# Patient Record
Sex: MALE | Race: WHITE | Employment: FULL TIME | URBAN - METROPOLITAN AREA
[De-identification: names, ages, dates, MRNs, and addresses within clinical notes are randomized per-mention and may not be internally consistent; named-entity substitution may affect disease eponyms.]

---

## 2018-07-14 ENCOUNTER — HOSPITAL ENCOUNTER (EMERGENCY)
Age: 28
Discharge: HOME | End: 2018-07-14
Attending: EMERGENCY MEDICINE
Payer: COMMERCIAL

## 2018-07-14 VITALS
BODY MASS INDEX: 34.24 KG/M2 | TEMPERATURE: 97.9 F | WEIGHT: 258.38 LBS | SYSTOLIC BLOOD PRESSURE: 147 MMHG | HEIGHT: 73 IN | DIASTOLIC BLOOD PRESSURE: 96 MMHG | HEART RATE: 70 BPM | RESPIRATION RATE: 18 BRPM | OXYGEN SATURATION: 97 %

## 2018-07-14 DIAGNOSIS — K13.79 MOUTH PAIN: ICD-10-CM

## 2018-07-14 DIAGNOSIS — R22.0 SWELLING OF UPPER LIP: Primary | ICD-10-CM

## 2018-07-14 LAB
BASOPHILS # BLD: 0 K/UL (ref 0–0.1)
BASOPHILS NFR BLD: 0 % (ref 0–1)
DIFFERENTIAL METHOD BLD: ABNORMAL
EOSINOPHIL # BLD: 0.1 K/UL (ref 0–0.4)
EOSINOPHIL NFR BLD: 1 % (ref 0–7)
ERYTHROCYTE [DISTWIDTH] IN BLOOD BY AUTOMATED COUNT: 12 % (ref 11.5–14.5)
HCT VFR BLD AUTO: 48.1 % (ref 36.6–50.3)
HGB BLD-MCNC: 16.5 G/DL (ref 12.1–17)
IMM GRANULOCYTES # BLD: 0 K/UL (ref 0–0.04)
IMM GRANULOCYTES NFR BLD AUTO: 0 % (ref 0–0.5)
LYMPHOCYTES # BLD: 2.6 K/UL (ref 0.8–3.5)
LYMPHOCYTES NFR BLD: 21 % (ref 12–49)
MCH RBC QN AUTO: 30.1 PG (ref 26–34)
MCHC RBC AUTO-ENTMCNC: 34.3 G/DL (ref 30–36.5)
MCV RBC AUTO: 87.8 FL (ref 80–99)
MONOCYTES # BLD: 1.2 K/UL (ref 0–1)
MONOCYTES NFR BLD: 10 % (ref 5–13)
NEUTS SEG # BLD: 8.7 K/UL (ref 1.8–8)
NEUTS SEG NFR BLD: 69 % (ref 32–75)
NRBC # BLD: 0 K/UL (ref 0–0.01)
NRBC BLD-RTO: 0 PER 100 WBC
PLATELET # BLD AUTO: 240 K/UL (ref 150–400)
PMV BLD AUTO: 11 FL (ref 8.9–12.9)
RBC # BLD AUTO: 5.48 M/UL (ref 4.1–5.7)
WBC # BLD AUTO: 12.7 K/UL (ref 4.1–11.1)

## 2018-07-14 PROCEDURE — 99283 EMERGENCY DEPT VISIT LOW MDM: CPT

## 2018-07-14 PROCEDURE — 74011250637 HC RX REV CODE- 250/637: Performed by: NURSE PRACTITIONER

## 2018-07-14 PROCEDURE — 36415 COLL VENOUS BLD VENIPUNCTURE: CPT | Performed by: NURSE PRACTITIONER

## 2018-07-14 PROCEDURE — 96374 THER/PROPH/DIAG INJ IV PUSH: CPT

## 2018-07-14 PROCEDURE — 85025 COMPLETE CBC W/AUTO DIFF WBC: CPT | Performed by: NURSE PRACTITIONER

## 2018-07-14 PROCEDURE — 74011250636 HC RX REV CODE- 250/636: Performed by: NURSE PRACTITIONER

## 2018-07-14 RX ORDER — CEPHALEXIN 500 MG/1
500 CAPSULE ORAL 4 TIMES DAILY
COMMUNITY
Start: 2018-07-10 | End: 2018-07-20

## 2018-07-14 RX ORDER — PHENOL/SODIUM PHENOLATE
20 AEROSOL, SPRAY (ML) MUCOUS MEMBRANE DAILY
COMMUNITY

## 2018-07-14 RX ORDER — KETOROLAC TROMETHAMINE 30 MG/ML
15 INJECTION, SOLUTION INTRAMUSCULAR; INTRAVENOUS
Status: COMPLETED | OUTPATIENT
Start: 2018-07-14 | End: 2018-07-14

## 2018-07-14 RX ORDER — PREDNISONE 20 MG/1
40 TABLET ORAL DAILY
COMMUNITY
Start: 2018-07-13 | End: 2018-07-14

## 2018-07-14 RX ORDER — SULFAMETHOXAZOLE AND TRIMETHOPRIM 800; 160 MG/1; MG/1
1 TABLET ORAL 2 TIMES DAILY
COMMUNITY
Start: 2018-07-13 | End: 2018-07-23

## 2018-07-14 RX ORDER — CETIRIZINE HCL 10 MG
10 TABLET ORAL DAILY
COMMUNITY

## 2018-07-14 RX ORDER — HYDROCODONE BITARTRATE AND ACETAMINOPHEN 5; 325 MG/1; MG/1
1 TABLET ORAL
Qty: 12 TAB | Refills: 0 | Status: SHIPPED | OUTPATIENT
Start: 2018-07-14

## 2018-07-14 RX ORDER — VALACYCLOVIR HYDROCHLORIDE 1 G/1
1000 TABLET, FILM COATED ORAL 3 TIMES DAILY
Qty: 21 TAB | Refills: 0 | Status: SHIPPED | OUTPATIENT
Start: 2018-07-14 | End: 2018-07-21

## 2018-07-14 RX ORDER — VALACYCLOVIR HYDROCHLORIDE 1 G/1
1000 TABLET, FILM COATED ORAL 2 TIMES DAILY
COMMUNITY
Start: 2018-07-17 | End: 2018-07-14

## 2018-07-14 RX ORDER — HYDROCODONE BITARTRATE AND ACETAMINOPHEN 5; 325 MG/1; MG/1
1 TABLET ORAL
Status: COMPLETED | OUTPATIENT
Start: 2018-07-14 | End: 2018-07-14

## 2018-07-14 RX ADMIN — KETOROLAC TROMETHAMINE 15 MG: 30 INJECTION, SOLUTION INTRAMUSCULAR; INTRAVENOUS at 10:40

## 2018-07-14 RX ADMIN — HYDROCODONE BITARTRATE AND ACETAMINOPHEN 1 TABLET: 5; 325 TABLET ORAL at 11:12

## 2018-07-14 NOTE — LETTER
[address] 
[address] [facility] [id number] 
[phone] Work/School Note Date: 7/14/2018 To Whom It May concern: 
 
Tasha Hernández was seen and treated today in the emergency room by the following provider(s): 
Attending Provider: Esperanza Watts MD 
Nurse Practitioner: Norman Lin NP. Tasha Hernández may return to work on 7/17/18. Sincerely, Norman Lin NP

## 2018-07-14 NOTE — ED PROVIDER NOTES
HPI Comments: 34 yo M presents ambulatory to the ED for evaluation of pain and swelling to the left upper lip. The pt states sx began with a \"pimple\" on Tuesday which he states popped and then began swelling on Wednesday. Pt states he was evaluated at the [address] [facility] on Wednesday and was prescribed Cephalexin QID and Valtrex at that time. States he was advised to discontinue Valtrex if no improvement of sx which he states he did after 2 doses. The pt states swelling and pain increased and he was evaluated at an Urgent Care yesterday and was also prescribed Bactrim and Prednisone. Pt reports no improvement of discomfort and presents today secondary to increase in pain and swelling. Denies trauma or injury prior to initial sx. Denies hx of HSV/cold sores or oral/genital lesions. Past Medical History:  No date: [facility])    Social History    Marital status: SINGLE              Spouse name:                       Years of education:                 Number of children:               Occupational History    None on file    Social History Main Topics    Smoking status: Former Smoker                                                                Packs/day: 0.00      Years: 0.00        Smokeless status: Never Used                        Alcohol use: Yes                Comment: Socially    Drug use: Yes                Special: Marijuana    Sexual activity: Not on file          Other Topics            Concern    None on file    Social History Narrative    None on file          Patient is a 33 y.o. male presenting with facial pain. The history is provided by the patient. Facial Pain    The incident occurred more than 2 days ago. He came to the ER via walk-in. Pertinent negatives include no numbness. Past Medical History:   Diagnosis Date    [facility])        Past Surgical History:   Procedure Laterality Date    ABDOMEN SURGERY PROC UNLISTED      Pyloric Stenosis         History reviewed.  No pertinent family history. Social History     Social History    Marital status: SINGLE     Spouse name: N/A    Number of children: N/A    Years of education: N/A     Occupational History    Not on file. Social History Main Topics    Smoking status: Former Smoker    Smokeless tobacco: Never Used    Alcohol use Yes      Comment: Socially    Drug use: Yes     Special: Marijuana    Sexual activity: Not on file     Other Topics Concern    Not on file     Social History Narrative    No narrative on file         ALLERGIES: Review of patient's allergies indicates no known allergies. Review of Systems   Constitutional: Negative for chills and fever. HENT: Negative for facial swelling, sore throat and trouble swallowing. Left upper lip swelling   Respiratory: Negative for shortness of breath. Cardiovascular: Negative for chest pain. Gastrointestinal: Negative for abdominal pain and nausea. Genitourinary: Negative. Musculoskeletal: Negative for neck pain. Skin: Negative for rash. Neurological: Negative for dizziness, speech difficulty and numbness. Psychiatric/Behavioral: Negative for confusion. Vitals:    07/14/18 0939   BP: (!) 155/104   Pulse: 76   Resp: 20   Temp: 98.2 °F (36.8 °C)   SpO2: 98%   Weight: 117.2 kg (258 lb 6 oz)   Height: 6' 1\" (1.854 m)            Physical Exam   Constitutional: He is oriented to person, place, and time. He appears well-developed and well-nourished. No distress. HENT:   Head: Normocephalic and atraumatic. Mouth/Throat: Oropharynx is clear and moist and mucous membranes are normal. No oral lesions. No lacerations. Swelling to L upper lip with no obvious lesions, sore noted   Eyes: Conjunctivae and EOM are normal. Pupils are equal, round, and reactive to light. Neck: Normal range of motion. Neck supple. Cardiovascular: Normal rate, regular rhythm, normal heart sounds and intact distal pulses.     Pulmonary/Chest: Effort normal and breath sounds normal.   No evidence of SOB. No tachypnea, tachycardia or evidence of distress. Abdominal: Soft. Bowel sounds are normal. He exhibits no distension and no mass. There is no tenderness. There is no rebound and no guarding. Musculoskeletal: Normal range of motion. Lymphadenopathy:        Head (right side): No submandibular adenopathy present. Head (left side): No submandibular adenopathy present. He has no cervical adenopathy. No anterior or posterior cervical adenopathy noted. Neurological: He is alert and oriented to person, place, and time. He has normal strength. No cranial nerve deficit or sensory deficit. Coordination normal. GCS eye subscore is 4. GCS verbal subscore is 5. GCS motor subscore is 6. Skin: Skin is warm and dry. Psychiatric: He has a normal mood and affect. His behavior is normal. Judgment and thought content normal.   Nursing note and vitals reviewed. MDM  Number of Diagnoses or Management Options  Mouth pain:   Swelling of upper lip:   Diagnosis management comments: 34 yo M with 5 day hx of L upper lip pain and swelling. Pt states sx started as a pimple on Tuesday which he states popped and then noted swelling to L upper lip on Wednesday for which he states he was evaluated at the [facility] [address] [facility] and prescribed Cephalexin and Valtrex. Was advised to discontinue Valtrex if no improvement of sx which the pt states he did after two doses. The pt states swelling and discomfort continued and he was re-evaluated yesterday at an Urgent Care center where he was prescribed Bactrim DS and Prednisone to take in addition to Keflex. Pt denies improvement of sx and increase in pain which he states has been unrelieved by Ibuprofen. Pt denies F/C, known hx of HSV/oral or penile lesions. Denies trauma or injury. CBC, medication for discomfort ordered for pt.   Discussed hx, presentation and findings with Dr. Karyle Hailey who personally evaluated the pt and agrees with plan of care for pt. Pt notes improvement of discomfort in ED. Will add pain medication, Valtrex for DC. Discussed stopping Prednisone with Dr. Blane Bauman who agrees with discharge plan for pt. Pt to return to ED for worsening sx which were reviewed prior to discharge. Pt provided work excuse as well as narcotic precautions prior to discharge.      LABORATORY TESTS:  Recent Results (from the past 12 hour(s))  -CBC WITH AUTOMATED DIFF  Collection Time: 07/14/18 10:42 AM       Result                                            Value                         Ref Range                       WBC                                               12.7 (H)                      4.1 - 11.1 K/uL                 RBC                                               5.48                          4.10 - 5.70 M/uL                HGB                                               16.5                          12.1 - 17.0 g/dL                HCT                                               48.1                          36.6 - 50.3 %                   MCV                                               87.8                          80.0 - 99.0 FL                  MCH                                               30.1                          26.0 - 34.0 PG                  MCHC                                              34.3                          30.0 - 36.5 g/dL                RDW                                               12.0                          11.5 - 14.5 %                   PLATELET                                          240                           150 - 400 K/uL                  MPV                                               11.0                          8.9 - 12.9 FL                   NRBC                                              0.0                           0  WBC                   ABSOLUTE NRBC                                     0.00                          0.00 - 0.01 K/uL                NEUTROPHILS 69                            32 - 75 %                       LYMPHOCYTES                                       21                            12 - 49 %                       MONOCYTES                                         10                            5 - 13 %                        EOSINOPHILS                                       1                             0 - 7 %                         BASOPHILS                                         0                             0 - 1 %                         IMMATURE GRANULOCYTES                             0                             0.0 - 0.5 %                     ABS. NEUTROPHILS                                  8.7 (H)                       1.8 - 8.0 K/UL                  ABS. LYMPHOCYTES                                  2.6                           0.8 - 3.5 K/UL                  ABS. MONOCYTES                                    1.2 (H)                       0.0 - 1.0 K/UL                  ABS. EOSINOPHILS                                  0.1                           0.0 - 0.4 K/UL                  ABS. BASOPHILS                                    0.0                           0.0 - 0.1 K/UL                  ABS. IMM. GRANS.                                  0.0                           0.00 - 0.04 K/UL                DF                                                AUTOMATED                                                  IMAGING RESULTS:  No orders to display    MEDICATIONS GIVEN:  Medications  ketorolac (TORADOL) injection 15 mg (15 mg IntraVENous Given 7/14/18 1040)  HYDROcodone-acetaminophen (NORCO) 5-325 mg per tablet 1 Tab (1 Tab Oral Given 7/14/18 1112)    IMPRESSION:  Swelling of upper lip  (primary encounter diagnosis)  Mouth pain    PLAN:  1.  Discharge Medication List as of 7/14/2018 11:52 AM    START taking these medications    HYDROcodone-acetaminophen (NORCO) 5-325 mg per tablet  Take 1 Tab by mouth every four (4) hours as needed for Pain. Max Daily Amount: 6 Tabs. Indications: Pain, Print, Disp-12 Tab, R-0      CONTINUE these medications which have CHANGED    valACYclovir (VALTREX) 1 gram tablet  Take 1 Tab by mouth three (3) times daily for 7 days. , Print, Disp-21 Tab, R-0      CONTINUE these medications which have NOT CHANGED    cephALEXin (KEFLEX) 500 mg capsule  Take 500 mg by mouth four (4) times daily. , Historical Med    trimethoprim-sulfamethoxazole (BACTRIM DS) 160-800 mg per tablet  Take 1 Tab by mouth two (2) times a day., Historical Med    cetirizine (ZYRTEC) 10 mg tablet  Take 10 mg by mouth daily. , Historical Med    Omeprazole delayed release (PRILOSEC D/R) 20 mg tablet  Take 20 mg by mouth daily. , Historical Med    B.ani/L.aci/L.héctor/L.plan/L. Hammad (PROBIOTIC FORMULA PO)  Take  by mouth., Historical Med      STOP taking these medications    predniSONE (DELTASONE) 20 mg tablet  Comments:  Reason for Stoppin. Follow-up Information     Follow up With Details Comments Contact Info    [facility] [place] [facility] Go to If symptoms worsen [address]  [phone]      3.  Return to ED if worse        Amount and/or Complexity of Data Reviewed  Clinical lab tests: ordered and reviewed  Discuss the patient with other providers: yes (Dr. Christa Saeed )    Patient Progress  Patient progress: stable        ED Course       Procedures

## 2018-07-14 NOTE — DISCHARGE INSTRUCTIONS
Mouth Injury: Care Instructions  Your Care Instructions    Mouth injuries are common. They may involve the teeth, jaw, lips, tongue, inner cheeks, or gums. A mouth injury can also affect the roof of the mouth, your neck, or your tonsils. You may injure your teeth during a fall or while playing sports. An injury can crack, chip, or break a tooth or make a tooth change color. A tooth also may be knocked out, loosened, moved, or jammed into the gum. An injury to the roof of your mouth, the back of your throat, or a tonsil can injure deeper tissues in your head or neck. These injuries can happen when you fall with a pointed object, such as a pencil, in your mouth. Sometimes you may bite the inside of your cheek several times while chewing, causing a sore. Or you may bite your tongue while playing sports or because of a seizure, a car or bicycle crash, an assault, or another injury. Braces or mouth jewelry can also poke or cause sores on mouth tissues. Sometimes the piece of skin between your lips and gums or under your tongue may tear or rip. A cut or tear to the tongue can bleed a lot. Small injuries may often heal on their own. If the injury is long or deep, it may need stitches that dissolve over time. Follow-up care is a key part of your treatment and safety. Be sure to make and go to all appointments, and call your doctor if you are having problems. It's also a good idea to know your test results and keep a list of the medicines you take. How can you care for yourself at home? · Apply a cold compress to the injured area. Or suck on a piece of ice or a flavored ice pop. · Rinse your wound with warm salt water right after meals. Saltwater rinses may relieve some pain. To make a saltwater solution for rinsing the mouth, mix 1 tsp of salt in 1 cup of warm water. · Eat soft foods that are easy to swallow. · Avoid foods that might sting.  These include salty or spicy foods, citrus fruits or juices, and tomatoes. · Be safe with medicines. Read and follow all instructions on the label. ¨ If the doctor gave you a prescription medicine for pain, take it as prescribed. ¨ If you are not taking a prescription pain medicine, ask your doctor if you can take an over-the-counter medicine. · If your doctor prescribed antibiotics, take them as directed. Do not stop taking them just because you feel better. You need to take the full course of antibiotics. · Try using a topical medicine, such as Orabase, to reduce mouth pain. When should you call for help? Call 911 anytime you think you may need emergency care. For example, call if:    · You have trouble breathing.    Call your doctor now or seek immediate medical care if:    · You have new or worse bleeding.     · You have signs of infection, such as:  ¨ Increased pain, swelling, warmth, or redness. ¨ Red streaks leading from the injured area. ¨ Pus draining from the injured area. ¨ A fever.    Watch closely for changes in your health, and be sure to contact your doctor if:    · You do not get better as expected. Where can you learn more? Go to http://diego-tony.info/. Enter H645 in the search box to learn more about \"Mouth Injury: Care Instructions. \"  Current as of: November 20, 2017  Content Version: 11.7  © 5392-1824 [employer]. Care instructions adapted under license by [employer] (which disclaims liability or warranty for this information). If you have questions about a medical condition or this instruction, always ask your healthcare professional. [place] [address] any warranty or liability for your use of this information.

## 2018-07-14 NOTE — ED TRIAGE NOTES
Triage Note: Patient is coming in for possible facial infection x 5 days. Patient was seen at [facility] for possible abscess vs Staph infection. Patient is on Keflex and Bactrim but the swelling continues and pain continues. Denies fever.